# Patient Record
Sex: MALE | Race: WHITE | NOT HISPANIC OR LATINO
[De-identification: names, ages, dates, MRNs, and addresses within clinical notes are randomized per-mention and may not be internally consistent; named-entity substitution may affect disease eponyms.]

---
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
## 2024-03-10 PROBLEM — Z71.82 EXERCISE COUNSELING: Status: RESOLVED | Noted: 2020-11-16 | Resolved: 2021-02-11
